# Patient Record
Sex: MALE | Race: WHITE | Employment: FULL TIME | URBAN - METROPOLITAN AREA
[De-identification: names, ages, dates, MRNs, and addresses within clinical notes are randomized per-mention and may not be internally consistent; named-entity substitution may affect disease eponyms.]

---

## 2019-03-25 ENCOUNTER — OFFICE VISIT (OUTPATIENT)
Dept: INTERNAL MEDICINE CLINIC | Age: 59
End: 2019-03-25

## 2019-03-25 VITALS
DIASTOLIC BLOOD PRESSURE: 78 MMHG | TEMPERATURE: 98.2 F | SYSTOLIC BLOOD PRESSURE: 130 MMHG | OXYGEN SATURATION: 99 % | BODY MASS INDEX: 24.44 KG/M2 | HEIGHT: 69 IN | RESPIRATION RATE: 14 BRPM | WEIGHT: 165 LBS | HEART RATE: 56 BPM

## 2019-03-25 DIAGNOSIS — Z94.0 RENAL TRANSPLANT, STATUS POST: ICD-10-CM

## 2019-03-25 DIAGNOSIS — B20 HIV DISEASE (HCC): Primary | ICD-10-CM

## 2019-03-25 DIAGNOSIS — E11.9 TYPE 2 DIABETES MELLITUS WITHOUT COMPLICATION, WITHOUT LONG-TERM CURRENT USE OF INSULIN (HCC): ICD-10-CM

## 2019-03-25 DIAGNOSIS — I10 PRIMARY HYPERTENSION: ICD-10-CM

## 2019-03-25 DIAGNOSIS — E78.5 HYPERLIPIDEMIA LDL GOAL <100: ICD-10-CM

## 2019-03-25 RX ORDER — OSELTAMIVIR PHOSPHATE 75 MG/1
CAPSULE ORAL
Refills: 0 | COMMUNITY
Start: 2019-02-26 | End: 2019-03-25 | Stop reason: SDUPTHER

## 2019-03-25 RX ORDER — FLUTICASONE PROPIONATE 50 MCG
1 SPRAY, SUSPENSION (ML) NASAL 2 TIMES DAILY
COMMUNITY

## 2019-03-25 RX ORDER — TAMSULOSIN HYDROCHLORIDE 0.4 MG/1
0.4 CAPSULE ORAL DAILY
COMMUNITY
Start: 2019-01-14 | End: 2019-10-30 | Stop reason: SDUPTHER

## 2019-03-25 RX ORDER — FOSAMPRENAVIR CALCIUM 700 MG/1
1400 TABLET, COATED ORAL 2 TIMES DAILY
COMMUNITY

## 2019-03-25 RX ORDER — LAMIVUDINE 150 MG/1
150 TABLET, FILM COATED ORAL DAILY
COMMUNITY
Start: 2019-02-25

## 2019-03-25 RX ORDER — ASPIRIN 81 MG/1
81 TABLET ORAL DAILY
COMMUNITY

## 2019-03-25 RX ORDER — OSELTAMIVIR PHOSPHATE 30 MG/1
30 CAPSULE ORAL DAILY
COMMUNITY
Start: 2019-02-26 | End: 2019-03-25 | Stop reason: ALTCHOICE

## 2019-03-25 RX ORDER — HYDRALAZINE HYDROCHLORIDE 50 MG/1
50 TABLET, FILM COATED ORAL 3 TIMES DAILY
COMMUNITY
Start: 2019-02-25

## 2019-03-25 RX ORDER — ABACAVIR SULFATE 300 MG/1
300 TABLET, FILM COATED ORAL 2 TIMES DAILY
COMMUNITY
Start: 2018-12-23

## 2019-03-25 RX ORDER — LISINOPRIL 10 MG/1
10 TABLET ORAL DAILY
COMMUNITY
Start: 2019-02-12

## 2019-03-25 RX ORDER — LINAGLIPTIN 5 MG/1
5 TABLET, FILM COATED ORAL DAILY
COMMUNITY
Start: 2019-02-25 | End: 2019-07-23 | Stop reason: SDUPTHER

## 2019-03-25 RX ORDER — PREDNISONE 5 MG/1
5 TABLET ORAL DAILY
COMMUNITY
Start: 2019-01-09

## 2019-03-25 RX ORDER — FISH OIL/DHA/EPA 1200-144MG
CAPSULE ORAL 2 TIMES DAILY
COMMUNITY

## 2019-03-25 RX ORDER — NEBIVOLOL HYDROCHLORIDE 10 MG/1
10 TABLET ORAL 2 TIMES DAILY
COMMUNITY
Start: 2019-02-12

## 2019-03-25 RX ORDER — FINASTERIDE 5 MG/1
5 TABLET, FILM COATED ORAL DAILY
COMMUNITY
Start: 2018-12-16

## 2019-03-25 RX ORDER — ACETAMINOPHEN 500 MG
4000 TABLET ORAL DAILY
COMMUNITY

## 2019-03-25 RX ORDER — FLUCONAZOLE 100 MG/1
100 TABLET ORAL DAILY
COMMUNITY

## 2019-03-25 RX ORDER — TACROLIMUS 0.5 MG/1
0.5 CAPSULE ORAL DAILY
COMMUNITY
Start: 2019-03-18

## 2019-03-25 RX ORDER — ZINC GLUCONATE 50 MG
500 TABLET ORAL DAILY
COMMUNITY

## 2019-03-25 RX ORDER — EZETIMIBE 10 MG/1
10 TABLET ORAL DAILY
COMMUNITY
Start: 2019-01-27 | End: 2020-01-16 | Stop reason: SDUPTHER

## 2019-03-25 RX ORDER — MYCOPHENOLATE MOFETIL 250 MG/1
750 CAPSULE ORAL 2 TIMES DAILY
COMMUNITY

## 2019-03-25 RX ORDER — ROSUVASTATIN CALCIUM 20 MG/1
20 TABLET, COATED ORAL DAILY
COMMUNITY
Start: 2019-01-23 | End: 2020-03-31 | Stop reason: SDUPTHER

## 2019-03-25 RX ORDER — CLONIDINE HYDROCHLORIDE 0.1 MG/1
0.1 TABLET ORAL 3 TIMES DAILY
COMMUNITY
Start: 2019-03-13

## 2019-03-25 NOTE — ACP (ADVANCE CARE PLANNING)
Patient has an existing Medical Directive, and understands to bring a copy to our office to be scanned into his chart.

## 2019-03-25 NOTE — PATIENT INSTRUCTIONS
Patient has an existing Medical Directive, and understands to bring a copy to our office to be scanned into his chart. Health Maintenance Due Topic Date Due  
 Hepatitis C Screening  1960  
 DTaP/Tdap/Td series (1 - Tdap) 11/02/1980  Shingrix Vaccine Age 50> (1 of 2) 11/02/2009  FOBT Q 1 YEAR AGE 50-75  11/02/2009  Influenza Age 5 to Adult  08/01/2018

## 2019-03-25 NOTE — PROGRESS NOTES
Caitlin Cohn 1960, is a 60 y.o. male, who is seen today for a new patient evaluation, summer and saw DrElisa locally wanted to handle everything even though he has been treated by nephrology since his renal transplant and by infectious disease for HIV infection. He is not comfortable with a primary care doctor handling these issues. He did have some chemistries and CBC checked last month and those looked pretty good. His high blood pressure and takes medicine regularly. Blood pressures been controlled. He thinks his lipids have been controlled but that has not been checked for at least 6 months. He had a kidney transplant in 2010 therapy regularly. Is been treated for HIV and thinks he is doing well with that. Does have diabetes takes one oral agent and follows his diabetic diet very well. He H and uses Flomax and Proscar. Past Medical History:  
Diagnosis Date  Anemia  Chronic kidney disease  Diabetes ([facility] [address]  Headache  History of peritoneal dialysis 2009  
 HIV (human immunodeficiency virus infection) ([facility] [address] 1990  Hypercholesterolemia  Hypertension  Polyp of right nasal cavity 2009  
 removed Past Surgical History:  
Procedure Laterality Date  HX COLONOSCOPY  2009  
 another 2014  HX TRANSPLANT  2010 Renal  
 HX WISDOM TEETH EXTRACTION    
 x3 Current Outpatient Medications Medication Sig Dispense Refill  TRADJENTA 5 mg tablet Take 5 mg by mouth daily. Indications: type 2 diabetes mellitus  ZIAGEN 300 mg tablet Take 300 mg by mouth two (2) times a day. Indications: HIV  lamiVUDine (EPIVIR) 150 mg tablet Take 150 mg by mouth daily.  cloNIDine HCl (CATAPRES) 0.1 mg tablet Take 0.1 mg by mouth three (3) times daily.  ezetimibe (ZETIA) 10 mg tablet Take 10 mg by mouth daily.  hydrALAZINE (APRESOLINE) 50 mg tablet Take 50 mg by mouth three (3) times daily.  lisinopril (PRINIVIL, ZESTRIL) 10 mg tablet Take 10 mg by mouth daily.  BYSTOLIC 10 mg tablet Take 10 mg by mouth two (2) times a day.  rosuvastatin (CRESTOR) 20 mg tablet Take 20 mg by mouth daily.  predniSONE (DELTASONE) 5 mg tablet Take 5 mg by mouth daily.  tacrolimus (PROGRAF) 0.5 mg capsule Take 0.5 mg by mouth daily.  finasteride (PROSCAR) 5 mg tablet Take 5 mg by mouth daily.  tamsulosin (FLOMAX) 0.4 mg capsule Take 0.4 mg by mouth daily.  cholecalciferol (VITAMIN D3) 2,000 unit cap capsule Take 4,000 Units by mouth daily.  fluconazole (DIFLUCAN) 100 mg tablet Take 100 mg by mouth daily. FDA advises cautious prescribing of oral fluconazole in pregnancy.  mycophenolate mofetil (CELLCEPT) 250 mg capsule Take 750 mg by mouth two (2) times a day.  fosamprenavir (LEXIVA) 700 mg tablet Take 1,400 mg by mouth two (2) times a day.  fish oil-dha-epa 1,200-144-216 mg cap Take  by mouth two (2) times a day.  MULTIVITAMIN PO Take  by mouth daily.  fluticasone propionate (FLONASE) 50 mcg/actuation nasal spray 1 [place] by Both Nostrils route two (2) times a day.  Magnesium Oxide 500 mg cap Take 500 mg by mouth daily.  aspirin delayed-release 81 mg tablet Take 81 mg by mouth daily. Allergies Allergen Reactions  Sulfa (Sulfonamide Antibiotics) Rash Social History Socioeconomic History  Marital status:  Spouse name: Not on file  Number of children: Not on file  Years of education: Not on file  Highest education level: Not on file Tobacco Use  Smoking status: Never Smoker  Smokeless tobacco: Never Used Substance and Sexual Activity  Alcohol use: Yes Comment: rare Review of systems other than the above is totally negative, no weight change in the last year and no sweats or chills fever dyspnea chest pain or other symptoms, his only new symptom is cramping in the lower abdomen stools of been a little looser than usual but no blood or fever. Visit Vitals /78 (BP 1 Location: Left arm, BP Patient Position: Sitting) Pulse (!) 56 Temp 98.2 °F (36.8 °C) (Oral) Resp 14 Ht 5' 8.5\" (1.74 m) Wt 165 lb (74.8 kg) SpO2 99% BMI 24.72 kg/m² Carotids are 2+ without bruits. No adenopathy or thyromegaly. Lungs are clear to percussion. Good breath sounds with no wheezing or crackles. Heart reveals a regular rhythm with normal S1 and S2 no murmur gallop click or rub. Not palpable. Abdomen is soft and nontender with no hepatosplenomegaly or masses, except he does have a transplanted kidney in the right lower abdomen. And no bruits. .  Pulses are 2+. Recent creatinine 1.43, estimated creatinine clearance from that creatinine 53 cc/min. 1+ protein in the urine, hemoglobin 12.8, white count 4800. Assessment: #1. HIV seems to be doing well, will refer to infectious disease and he will continue the above 2 antiviral agents. #2.  Renal transplant 9 years ago doing well, refer to nephrology in [place] antirejection drugs. #3.  Diabetes, check hemoglobin A1c today. We will continue Tradjenta. #4. Hyperlipidemia, will check lipids today, fasting, and he will continue atorvastatin and Zetia. #5.  BPH doing well, he will continue Proscar and tamsulosin. Follow-up in 6 months or sooner if needed and we will call lab results when available. Garrison Reis [facility], [address] Please note: This document has been produced using voice recognition software. Unrecognized errors in transcription may be present.

## 2019-03-25 NOTE — PROGRESS NOTES
Chief Complaint Patient presents with  New Patient ROOM    9  Referral Request  
  patient wants to be referred to a Nephrologist he had a Kidney Transplant done 2010 1. Have you been to the ER, urgent care clinic since your last visit? Hospitalized since your last visit? No 
 
2. Have you seen or consulted any other health care providers outside of the [address] [place] [address] since your last visit? Include any pap smears or colon screening. No 
 
Patient has an existing Medical Directive, and understands to bring a copy to our office to be scanned into his chart. Health Maintenance Due Topic Date Due  
 Hepatitis C Screening  1960  
 DTaP/Tdap/Td series (1 - Tdap) 11/02/1980  Shingrix Vaccine Age 50> (1 of 2) 11/02/2009  FOBT Q 1 YEAR AGE 50-75  11/02/2009  Influenza Age 5 to Adult  08/01/2018

## 2019-04-01 DIAGNOSIS — B20 HIV DISEASE (HCC): ICD-10-CM

## 2019-04-01 DIAGNOSIS — Z94.0 RENAL TRANSPLANT, STATUS POST: Primary | ICD-10-CM

## 2019-04-27 LAB
CHOLEST SERPL-MCNC: 148 MG/DL (ref 100–199)
EST. AVERAGE GLUCOSE BLD GHB EST-MCNC: 120 MG/DL
HBA1C MFR BLD: 5.8 % (ref 4.8–5.6)
HDLC SERPL-MCNC: 39 MG/DL
INTERPRETATION, 910389: NORMAL
LDLC SERPL CALC-MCNC: 76 MG/DL (ref 0–99)
Lab: NORMAL
TRIGL SERPL-MCNC: 166 MG/DL (ref 0–149)
VLDLC SERPL CALC-MCNC: 33 MG/DL (ref 5–40)

## 2019-07-23 RX ORDER — LINAGLIPTIN 5 MG/1
5 TABLET, FILM COATED ORAL DAILY
Qty: 90 TAB | Refills: 1 | Status: SHIPPED | OUTPATIENT
Start: 2019-07-23 | End: 2020-01-03

## 2019-07-23 NOTE — TELEPHONE ENCOUNTER
Last Visit: 3/25/19 with MD Beata Moulton  Next Appointment: f/u in 6 months    Requested Prescriptions     Pending Prescriptions Disp Refills    TRADJENTA 5 mg tablet 90 Tab 1     Sig: Take 1 Tab by mouth daily.  Indications: type 2 diabetes mellitus

## 2019-10-09 ENCOUNTER — OFFICE VISIT (OUTPATIENT)
Dept: INTERNAL MEDICINE CLINIC | Age: 59
End: 2019-10-09

## 2019-10-09 VITALS
WEIGHT: 162.8 LBS | DIASTOLIC BLOOD PRESSURE: 96 MMHG | HEIGHT: 69 IN | HEART RATE: 63 BPM | OXYGEN SATURATION: 98 % | BODY MASS INDEX: 24.11 KG/M2 | SYSTOLIC BLOOD PRESSURE: 154 MMHG | RESPIRATION RATE: 12 BRPM | TEMPERATURE: 98.3 F

## 2019-10-09 DIAGNOSIS — Z23 ENCOUNTER FOR IMMUNIZATION: ICD-10-CM

## 2019-10-09 DIAGNOSIS — T14.8XXA TORN MUSCLE: Primary | ICD-10-CM

## 2019-10-09 NOTE — PATIENT INSTRUCTIONS
Vaccine Information Statement    Influenza (Flu) Vaccine (Inactivated or Recombinant): What You Need to Know    Many Vaccine Information Statements are available in Swedish and other languages. See www.immunize.org/vis  Hojas de información sobre vacunas están disponibles en español y en muchos otros idiomas. Visite www.immunize.org/vis    1. Why get vaccinated? Influenza vaccine can prevent influenza (flu). Flu is a contagious disease that spreads around the [facility] every year, usually between October and May. Anyone can get the flu, but it is more dangerous for some people. Infants and young children, people 72years of age and older, pregnant women, and people with certain health conditions or a weakened immune system are at greatest risk of flu complications. Pneumonia, bronchitis, sinus infections and ear infections are examples of flu-related complications. If you have a medical condition, such as heart disease, cancer or diabetes, flu can make it worse. Flu can cause fever and chills, sore throat, muscle aches, fatigue, cough, headache, and runny or stuffy nose. Some people may have vomiting and diarrhea, though this is more common in children than adults. Each year thousands of people in the [facility] die from flu, and many more are hospitalized. Flu vaccine prevents millions of illnesses and flu-related visits to the doctor each year. 2. Influenza vaccines     CDC recommends everyone 10months of age and older get vaccinated every flu season. Children 6 months through 6years of age may need 2 doses during a single flu season. Everyone else needs only 1 dose each flu season. It takes about 2 weeks for protection to develop after vaccination. There are many flu viruses, and they are always changing. Each year a new flu vaccine is made to protect against three or four viruses that are likely to cause disease in the upcoming flu season.  Even when the vaccine doesnt exactly match these viruses, it may still provide some protection. Influenza vaccine does not cause flu. Influenza vaccine may be given at the same time as other vaccines. 3. Talk with your health care provider    Tell your vaccine provider if the person getting the vaccine:   Has had an allergic reaction after a previous dose of influenza vaccine, or has any severe, life-threatening allergies.  Has ever had Guillain-Barré Syndrome (also called GBS). In some cases, your health care provider may decide to postpone influenza vaccination to a future visit. People with minor illnesses, such as a cold, may be vaccinated. People who are moderately or severely ill should usually wait until they recover before getting influenza vaccine. Your health care provider can give you more information. 4. Risks of a reaction     Soreness, redness, and swelling where shot is given, fever, muscle aches, and headache can happen after influenza vaccine.  There may be a very small increased risk of Guillain-Barré Syndrome (GBS) after inactivated influenza vaccine (the flu shot). Rubinly Rosalio children who get the flu shot along with pneumococcal vaccine (PCV13), and/or DTaP vaccine at the same time might be slightly more likely to have a seizure caused by fever. Tell your health care provider if a child who is getting flu vaccine has ever had a seizure. People sometimes faint after medical procedures, including vaccination. Tell your provider if you feel dizzy or have vision changes or ringing in the ears. As with any medicine, there is a very remote chance of a vaccine causing a severe allergic reaction, other serious injury, or death. 5. What if there is a serious problem? An allergic reaction could occur after the vaccinated person leaves the clinic.  If you see signs of a severe allergic reaction (hives, swelling of the face and throat, difficulty breathing, a fast heartbeat, dizziness, or weakness), call 9-1-1 and get the person to the nearest hospital.    For other signs that concern you, call your health care provider. Adverse reactions should be reported to the Vaccine Adverse Event Reporting System (VAERS). Your health care provider will usually file this report, or you can do it yourself. Visit the VAERS website at www.vaers. [facility].gov or call 4-871.518.4900. VAERS is only for reporting reactions, and VAERS staff do not give medical advice. 6. The National Vaccine Injury Compensation Program    The [facility] Vaccine Injury Compensation Program (VICP) is a federal program that was created to compensate people who may have been injured by certain vaccines. Visit the VICP website at www.[facility].gov/vaccinecompensation or call 2-779.694.6301 to learn about the program and about filing a claim. There is a time limit to file a claim for compensation. 7. How can I learn more?  Ask your health care provider.  Call your local or state health department.  Contact the Centers for Disease Control and Prevention (CDC):  - Call 1-715.437.7724 (1-800-CDC-INFO) or  - Visit CDCs influenza website at www.cdc.gov/flu    Vaccine Information Statement (Interim)  Inactivated Influenza Vaccine   8/15/2019  [address] [id number]   Department of Health and Human Services  Centers for Disease Control and Prevention    Office Use Only

## 2019-10-30 RX ORDER — TAMSULOSIN HYDROCHLORIDE 0.4 MG/1
0.4 CAPSULE ORAL DAILY
Qty: 90 CAP | Refills: 0 | Status: SHIPPED | OUTPATIENT
Start: 2019-10-30 | End: 2020-01-29

## 2019-10-30 NOTE — TELEPHONE ENCOUNTER
Last Visit: 10/9/19 with MD Jill Lea  Next Appointment: none    Requested Prescriptions     Pending Prescriptions Disp Refills    tamsulosin (FLOMAX) 0.4 mg capsule 90 Cap 3     Sig: Take 1 Cap by mouth daily.

## 2019-12-03 ENCOUNTER — OFFICE VISIT (OUTPATIENT)
Dept: INTERNAL MEDICINE CLINIC | Age: 59
End: 2019-12-03

## 2019-12-03 VITALS
SYSTOLIC BLOOD PRESSURE: 138 MMHG | DIASTOLIC BLOOD PRESSURE: 82 MMHG | WEIGHT: 168 LBS | TEMPERATURE: 98.5 F | HEART RATE: 55 BPM | OXYGEN SATURATION: 97 % | RESPIRATION RATE: 16 BRPM | BODY MASS INDEX: 24.88 KG/M2 | HEIGHT: 69 IN

## 2019-12-03 DIAGNOSIS — R22.2 LOCALIZED SWELLING OF CHEST WALL: Primary | ICD-10-CM

## 2019-12-03 NOTE — PROGRESS NOTES
Dre Mendoza 1960, is a 60 y.o. male, who is seen today for swelling over his right upper chest.  He noticed this a week ago, he could see it and feel it but there is no tenderness or pain. He thinks it could have possibly been there longer than a week but never noticed it. Past Medical History:   Diagnosis Date    Anemia     Chronic kidney disease     [facility] [address]     Headache     History of peritoneal dialysis 2009    HIV (human immunodeficiency virus infection) ([facility] [address] 1990    Hypercholesterolemia     Hypertension     Polyp of right nasal cavity 2009    removed     Current Outpatient Medications   Medication Sig Dispense Refill    tamsulosin (FLOMAX) 0.4 mg capsule Take 1 Cap by mouth daily. 90 Cap 0    TRADJENTA 5 mg tablet Take 1 Tab by mouth daily. Indications: type 2 diabetes mellitus 90 Tab 1    ZIAGEN 300 mg tablet Take 300 mg by mouth two (2) times a day. Indications: HIV      lamiVUDine (EPIVIR) 150 mg tablet Take 150 mg by mouth daily.  cloNIDine HCl (CATAPRES) 0.1 mg tablet Take 0.1 mg by mouth three (3) times daily.  ezetimibe (ZETIA) 10 mg tablet Take 10 mg by mouth daily.  hydrALAZINE (APRESOLINE) 50 mg tablet Take 50 mg by mouth three (3) times daily.  lisinopril (PRINIVIL, ZESTRIL) 10 mg tablet Take 10 mg by mouth daily.  BYSTOLIC 10 mg tablet Take 10 mg by mouth two (2) times a day.  rosuvastatin (CRESTOR) 20 mg tablet Take 20 mg by mouth daily.  predniSONE (DELTASONE) 5 mg tablet Take 5 mg by mouth daily.  tacrolimus (PROGRAF) 0.5 mg capsule Take 0.5 mg by mouth daily.  finasteride (PROSCAR) 5 mg tablet Take 5 mg by mouth daily.  cholecalciferol (VITAMIN D3) 2,000 unit cap capsule Take 4,000 Units by mouth daily.  fluconazole (DIFLUCAN) 100 mg tablet Take 100 mg by mouth daily. FDA advises cautious prescribing of oral fluconazole in pregnancy.       mycophenolate mofetil (CELLCEPT) 250 mg capsule Take 750 mg by mouth two (2) times a day.  fosamprenavir (LEXIVA) 700 mg tablet Take 1,400 mg by mouth two (2) times a day.  fish oil-dha-epa 1,200-144-216 mg cap Take  by mouth two (2) times a day.  MULTIVITAMIN PO Take  by mouth daily.  fluticasone propionate (FLONASE) 50 mcg/actuation nasal spray 1 [place] by Both Nostrils route two (2) times a day.  Magnesium Oxide 500 mg cap Take 500 mg by mouth daily.  aspirin delayed-release 81 mg tablet Take 81 mg by mouth daily. Visit Vitals  /82 (BP 1 Location: Left arm, BP Patient Position: Sitting)   Pulse (!) 55   Temp 98.5 °F (36.9 °C) (Oral)   Resp 16   Ht 5' 8.5\" (1.74 m)   Wt 168 lb (76.2 kg)   SpO2 97%   BMI 25.17 kg/m²     Over the upper right anterior chest there is about a 5 cm diameter raised area with normal overlying skin. It is nontender and fairly soft, could be fluid, could be a very soft lipoma but certainly feels more like fluid. The surrounding tissue feels normal and looks normal.    Assessment: Soft tissue swelling, rule out fluid versus lipoma, if there is a question after ultrasound will send to a general surgeon. Garrison Busch MD FACP    Please note: This document has been produced using voice recognition software. Unrecognized errors in transcription may be present. This visit lasted 15 minutes, greater than 50% of the time was spent counseling, I discussed with them the possible etiologies and further evaluation depending on ultrasound results.   Kimberly Chong

## 2019-12-03 NOTE — PROGRESS NOTES
Jeanne Henderson presents today for   Chief Complaint   Patient presents with   [address] [facility] [address]     non tender lump on right side of chest x 1 week            Coordination of Care:  1. Have you been to the ER, urgent care clinic since your last visit? Hospitalized since your last visit? no    2. Have you seen or consulted any other health care providers outside of the [address] [place] [address] since your last visit? Include any pap smears or colon screening.  no

## 2019-12-10 ENCOUNTER — HOSPITAL ENCOUNTER (OUTPATIENT)
Dept: ULTRASOUND IMAGING | Age: 59
Discharge: HOME | End: 2019-12-10
Attending: INTERNAL MEDICINE
Payer: OTHER GOVERNMENT

## 2019-12-10 DIAGNOSIS — R22.2 LOCALIZED SWELLING OF CHEST WALL: ICD-10-CM

## 2019-12-10 PROCEDURE — 76604 US EXAM CHEST: CPT

## 2019-12-13 ENCOUNTER — TELEPHONE (OUTPATIENT)
Dept: INTERNAL MEDICINE CLINIC | Age: 59
End: 2019-12-13

## 2019-12-13 DIAGNOSIS — R22.2 MASS OF CHEST WALL, RIGHT: Primary | ICD-10-CM

## 2019-12-26 ENCOUNTER — HOSPITAL ENCOUNTER (OUTPATIENT)
Dept: MRI IMAGING | Age: 59
Discharge: HOME | End: 2019-12-26
Attending: INTERNAL MEDICINE
Payer: OTHER GOVERNMENT

## 2019-12-26 DIAGNOSIS — R22.2 MASS OF CHEST WALL, RIGHT: ICD-10-CM

## 2019-12-26 PROCEDURE — 71550 MRI CHEST W/O DYE: CPT

## 2020-01-02 ENCOUNTER — TELEPHONE (OUTPATIENT)
Dept: INTERNAL MEDICINE CLINIC | Age: 60
End: 2020-01-02

## 2020-01-03 RX ORDER — LINAGLIPTIN 5 MG/1
TABLET, FILM COATED ORAL
Qty: 90 TAB | Refills: 4 | Status: SHIPPED | OUTPATIENT
Start: 2020-01-03 | End: 2021-03-30 | Stop reason: SDUPTHER

## 2020-01-06 DIAGNOSIS — D17.1 LIPOMA OF ANTERIOR CHEST WALL: Primary | ICD-10-CM

## 2020-01-16 NOTE — TELEPHONE ENCOUNTER
Last Visit: 12/3/19 with MD Khoury Short  Next Appointment: none    Requested Prescriptions     Pending Prescriptions Disp Refills    ezetimibe (ZETIA) 10 mg tablet 90 Tab 3     Sig: Take 1 Tab by mouth daily.

## 2020-01-17 RX ORDER — EZETIMIBE 10 MG/1
10 TABLET ORAL DAILY
Qty: 90 TAB | Refills: 3 | Status: SHIPPED | OUTPATIENT
Start: 2020-01-17

## 2020-01-29 RX ORDER — TAMSULOSIN HYDROCHLORIDE 0.4 MG/1
CAPSULE ORAL
Qty: 90 CAP | Refills: 4 | Status: SHIPPED | OUTPATIENT
Start: 2020-01-29

## 2020-03-18 ENCOUNTER — OFFICE VISIT (OUTPATIENT)
Dept: INTERNAL MEDICINE CLINIC | Age: 60
End: 2020-03-18

## 2020-03-18 VITALS
HEART RATE: 58 BPM | TEMPERATURE: 98 F | WEIGHT: 159 LBS | DIASTOLIC BLOOD PRESSURE: 81 MMHG | SYSTOLIC BLOOD PRESSURE: 157 MMHG | OXYGEN SATURATION: 96 % | HEIGHT: 69 IN | BODY MASS INDEX: 23.55 KG/M2 | RESPIRATION RATE: 12 BRPM

## 2020-03-18 DIAGNOSIS — Z94.0 RENAL TRANSPLANT, STATUS POST: ICD-10-CM

## 2020-03-18 DIAGNOSIS — B20 HIV DISEASE (HCC): ICD-10-CM

## 2020-03-18 DIAGNOSIS — I10 PRIMARY HYPERTENSION: ICD-10-CM

## 2020-03-18 NOTE — PROGRESS NOTES
Akash Galo 1960, is a 60 y.o. male, who is seen today for uncontrolled diabetes. The patient had very well controlled diabetes last time I saw him about 11 months ago with hemoglobin A1c of 5.8. He is on Tradjenta. Nothing is changed as far as he knows about his nephrologist got a glucose of around 400 couple weeks ago so we ordered fasting lab and the fasting glucose was 345 and hemoglobin A1c was 12.7. He feels generally well, he is to have nocturia 1 or 2 times per night and now it is 2 or 3 times per night the last several weeks. He has detected some increased urinary frequency in the daytime. From a weight of 5 months ago he is down just 3 pounds. He is drinking a little more fluids. He has had no evidence of any type of infection having no chills or fever etc.    Past Medical History:   Diagnosis Date    Anemia     Chronic kidney disease     [facility] [address]     Headache     History of peritoneal dialysis 2009    HIV (human immunodeficiency virus infection) ([facility] [address] 1990    Hypercholesterolemia     Hypertension     Polyp of right nasal cavity 2009    removed     Current Outpatient Medications   Medication Sig Dispense Refill    tamsulosin (FLOMAX) 0.4 mg capsule TAKE 1 CAPSULE DAILY 90 Cap 4    ezetimibe (ZETIA) 10 mg tablet Take 1 Tab by mouth daily. 90 Tab 3    TRADJENTA 5 mg tablet TAKE 1 TABLET DAILY FOR TYPE 2 DIABETES MELLITUS 90 Tab 4    ZIAGEN 300 mg tablet Take 300 mg by mouth two (2) times a day. Indications: HIV      lamiVUDine (EPIVIR) 150 mg tablet Take 150 mg by mouth daily.  cloNIDine HCl (CATAPRES) 0.1 mg tablet Take 0.1 mg by mouth three (3) times daily.  hydrALAZINE (APRESOLINE) 50 mg tablet Take 50 mg by mouth three (3) times daily.  lisinopril (PRINIVIL, ZESTRIL) 10 mg tablet Take 10 mg by mouth daily.  BYSTOLIC 10 mg tablet Take 10 mg by mouth two (2) times a day.  rosuvastatin (CRESTOR) 20 mg tablet Take 20 mg by mouth daily.       predniSONE (DELTASONE) 5 mg tablet Take 5 mg by mouth daily.  tacrolimus (PROGRAF) 0.5 mg capsule Take 0.5 mg by mouth daily.  finasteride (PROSCAR) 5 mg tablet Take 5 mg by mouth daily.  cholecalciferol (VITAMIN D3) 2,000 unit cap capsule Take 4,000 Units by mouth daily.  mycophenolate mofetil (CELLCEPT) 250 mg capsule Take 750 mg by mouth two (2) times a day.  fosamprenavir (LEXIVA) 700 mg tablet Take 1,400 mg by mouth two (2) times a day.  fish oil-dha-epa 1,200-144-216 mg cap Take  by mouth two (2) times a day.  MULTIVITAMIN PO Take  by mouth daily.  fluticasone propionate (FLONASE) 50 mcg/actuation nasal spray 1 [place] by Both Nostrils route two (2) times a day.  Magnesium Oxide 500 mg cap Take 500 mg by mouth daily.  aspirin delayed-release 81 mg tablet Take 81 mg by mouth daily.  fluconazole (DIFLUCAN) 100 mg tablet Take 100 mg by mouth daily. FDA advises cautious prescribing of oral fluconazole in pregnancy. Visit Vitals  /81 (BP 1 Location: Left arm, BP Patient Position: Sitting)   Pulse (!) 58   Temp 98 °F (36.7 °C) (Oral)   Resp 12   Ht 5' 8.5\" (1.74 m)   Wt 159 lb (72.1 kg)   SpO2 96%   BMI 23.82 kg/m²     He was not further examined today. I did not recheck his blood pressure. Assessment: History of very well-controlled diabetes as recently as a year ago now with uncontrolled glucose. He is minimally symptomatic so it is not an emergency that he receive additional medicine but needs to be seen within the next 2 or 3 weeks by endocrinology, the most convenient for him will be at [facility]. His other doctors are in [place]. His nephrologist is Dr. Annemarie Mondragon.    Follow-up here as needed, most of his care is administered by his  nephrology for his history of renal transplant and infectious disease for his history of HIV.     This visit lasted 25 minutes, greater than 50% of the time was spent counseling on diabetes and its treatment including diet and possible medications, for now he will continue Tradjenta 5 mg daily. Garrison Lamb MD FACP    Please note: This document has been produced using voice recognition software. Unrecognized errors in transcription may be present.

## 2020-03-31 NOTE — TELEPHONE ENCOUNTER
This med is listed as historical. Please sign if appropriate. Last Visit: 3/18/20 with MD Marcus Harrison  Next Appointment: none    Requested Prescriptions     Pending Prescriptions Disp Refills    rosuvastatin (CRESTOR) 20 mg tablet 90 Tab 3     Sig: Take 1 Tab by mouth daily.

## 2020-04-01 RX ORDER — ROSUVASTATIN CALCIUM 20 MG/1
20 TABLET, COATED ORAL DAILY
Qty: 90 TAB | Refills: 3 | Status: SHIPPED | OUTPATIENT
Start: 2020-04-01

## 2021-03-30 RX ORDER — LINAGLIPTIN 5 MG/1
5 TABLET, FILM COATED ORAL DAILY
Qty: 90 TAB | Refills: 0 | Status: SHIPPED | OUTPATIENT
Start: 2021-03-30

## 2021-03-30 NOTE — TELEPHONE ENCOUNTER
Last Visit: 3/18/20 with MD Brielle Weinberg  Next Appointment: none  Previous Refill Encounter(s): 1/3/20 #90 with 4 refills    Requested Prescriptions     Pending Prescriptions Disp Refills    Tradjenta 5 mg tablet 90 Tab 0     Sig: Take 1 Tab by mouth daily.  Indications: type 2 diabetes mellitus

## 2022-03-18 PROBLEM — E11.9 TYPE 2 DIABETES MELLITUS WITHOUT COMPLICATION, WITHOUT LONG-TERM CURRENT USE OF INSULIN (HCC): Status: ACTIVE | Noted: 2019-03-25

## 2022-03-19 PROBLEM — B20 HIV DISEASE (HCC): Status: ACTIVE | Noted: 2019-03-25

## 2022-03-19 PROBLEM — Z94.0 RENAL TRANSPLANT, STATUS POST: Status: ACTIVE | Noted: 2019-03-25

## 2022-03-19 PROBLEM — I10 PRIMARY HYPERTENSION: Status: ACTIVE | Noted: 2019-03-25

## 2022-03-20 PROBLEM — E78.5 HYPERLIPIDEMIA LDL GOAL <100: Status: ACTIVE | Noted: 2019-03-25

## 2024-11-26 NOTE — PROGRESS NOTES
Raquel Meter 1960, is a 60 y.o. male, who is seen today for pain in both knees. 3 or 4 weeks ago he was playing kickball and he sprinted to go after the ball he felt pain in both anterior thighs. The pain continued and then seemed to move into the knees and bruising was visible around the knees after 2 or 3 weeks. He still has some discomfort but more around the knees at this point. He went to an urgent care center about 4 days ago and no additional studies were felt to be necessary. Past Medical History:   Diagnosis Date    Anemia     Chronic kidney disease     Diabetes ([address]     Headache     History of peritoneal dialysis 2009    [facility]    Hypercholesterolemia     Hypertension     Polyp of right nasal cavity 2009    removed     Current Outpatient Medications   Medication Sig Dispense Refill    TRADJENTA 5 mg tablet Take 1 Tab by mouth daily. Indications: type 2 diabetes mellitus 90 Tab 1    ZIAGEN 300 mg tablet Take 300 mg by mouth two (2) times a day. Indications: HIV      lamiVUDine (EPIVIR) 150 mg tablet Take 150 mg by mouth daily.  cloNIDine HCl (CATAPRES) 0.1 mg tablet Take 0.1 mg by mouth three (3) times daily.  ezetimibe (ZETIA) 10 mg tablet Take 10 mg by mouth daily.  hydrALAZINE (APRESOLINE) 50 mg tablet Take 50 mg by mouth three (3) times daily.  lisinopril (PRINIVIL, ZESTRIL) 10 mg tablet Take 10 mg by mouth daily.  BYSTOLIC 10 mg tablet Take 10 mg by mouth two (2) times a day.  rosuvastatin (CRESTOR) 20 mg tablet Take 20 mg by mouth daily.  predniSONE (DELTASONE) 5 mg tablet Take 5 mg by mouth daily.  tacrolimus (PROGRAF) 0.5 mg capsule Take 0.5 mg by mouth daily.  finasteride (PROSCAR) 5 mg tablet Take 5 mg by mouth daily.  tamsulosin (FLOMAX) 0.4 mg capsule Take 0.4 mg by mouth daily.       cholecalciferol (VITAMIN D3) 2,000 unit cap capsule Take 4,000 Units by mouth daily.      fluconazole (DIFLUCAN) 100 mg tablet Take 100 mg by mouth daily. FDA advises cautious prescribing of oral fluconazole in pregnancy.  mycophenolate mofetil (CELLCEPT) 250 mg capsule Take 750 mg by mouth two (2) times a day.  fosamprenavir (LEXIVA) 700 mg tablet Take 1,400 mg by mouth two (2) times a day.  fish oil-dha-epa 1,200-144-216 mg cap Take  by mouth two (2) times a day.  MULTIVITAMIN PO Take  by mouth daily.  fluticasone propionate (FLONASE) 50 mcg/actuation nasal spray 1 [place] by Both Nostrils route two (2) times a day.  Magnesium Oxide 500 mg cap Take 500 mg by mouth daily.  aspirin delayed-release 81 mg tablet Take 81 mg by mouth daily. Visit Vitals  BP (!) 154/96 (BP 1 Location: Left arm, BP Patient Position: Sitting)   Pulse 63   Temp 98.3 °F (36.8 °C) (Oral)   Resp 12   Ht 5' 8.5\" (1.74 m)   Wt 162 lb 12.8 oz (73.8 kg)   SpO2 98%   BMI 24.39 kg/m²     The knees have good range of motion and good stability of the cruciate and collateral ligaments. No effusion. No tenderness. No visible bruising anywhere in the legs now. Has a very firm area of anterior thigh on the right and a smaller area in the upper anterior thigh on the left consistent with muscle tear. These are indurated and very firm and only slightly tender and no discoloration of the overlying tissues. Assessment: Bilateral torn quadriceps, the induration should gradually improve and the pain around the knees is probably from blood that was there a week or so ago and is resolving. The knees have good range of motion without pain and good stability, but nothing more needs to be evaluated right now. He should avoid sprinting in the future. Garrison Morse MD FACP    Please note: This document has been produced using voice recognition software. Unrecognized errors in transcription may be present. - - -